# Patient Record
Sex: FEMALE | Employment: OTHER | URBAN - METROPOLITAN AREA
[De-identification: names, ages, dates, MRNs, and addresses within clinical notes are randomized per-mention and may not be internally consistent; named-entity substitution may affect disease eponyms.]

---

## 2023-01-30 ENCOUNTER — OFFICE VISIT (OUTPATIENT)
Dept: URGENT CARE | Facility: CLINIC | Age: 68
End: 2023-01-30
Payer: MEDICARE

## 2023-01-30 VITALS
DIASTOLIC BLOOD PRESSURE: 87 MMHG | SYSTOLIC BLOOD PRESSURE: 151 MMHG | BODY MASS INDEX: 21.6 KG/M2 | HEIGHT: 60 IN | HEART RATE: 84 BPM | TEMPERATURE: 99 F | RESPIRATION RATE: 18 BRPM | OXYGEN SATURATION: 95 % | WEIGHT: 110 LBS

## 2023-01-30 DIAGNOSIS — S69.91XA FINGER INJURY, RIGHT, INITIAL ENCOUNTER: ICD-10-CM

## 2023-01-30 DIAGNOSIS — S60.10XA SUBUNGUAL CONTUSION OF FINGERNAIL, INITIAL ENCOUNTER: Primary | ICD-10-CM

## 2023-01-30 PROCEDURE — 1159F MED LIST DOCD IN RCRD: CPT | Mod: CPTII,S$GLB,, | Performed by: PHYSICIAN ASSISTANT

## 2023-01-30 PROCEDURE — 99213 PR OFFICE/OUTPT VISIT, EST, LEVL III, 20-29 MIN: ICD-10-PCS | Mod: S$GLB,,, | Performed by: PHYSICIAN ASSISTANT

## 2023-01-30 PROCEDURE — 3079F PR MOST RECENT DIASTOLIC BLOOD PRESSURE 80-89 MM HG: ICD-10-PCS | Mod: CPTII,S$GLB,, | Performed by: PHYSICIAN ASSISTANT

## 2023-01-30 PROCEDURE — 3008F PR BODY MASS INDEX (BMI) DOCUMENTED: ICD-10-PCS | Mod: CPTII,S$GLB,, | Performed by: PHYSICIAN ASSISTANT

## 2023-01-30 PROCEDURE — 1125F PR PAIN SEVERITY QUANTIFIED, PAIN PRESENT: ICD-10-PCS | Mod: CPTII,S$GLB,, | Performed by: PHYSICIAN ASSISTANT

## 2023-01-30 PROCEDURE — 1160F PR REVIEW ALL MEDS BY PRESCRIBER/CLIN PHARMACIST DOCUMENTED: ICD-10-PCS | Mod: CPTII,S$GLB,, | Performed by: PHYSICIAN ASSISTANT

## 2023-01-30 PROCEDURE — 73140 X-RAY EXAM OF FINGER(S): CPT | Mod: RT,S$GLB,, | Performed by: RADIOLOGY

## 2023-01-30 PROCEDURE — 99213 OFFICE O/P EST LOW 20 MIN: CPT | Mod: S$GLB,,, | Performed by: PHYSICIAN ASSISTANT

## 2023-01-30 PROCEDURE — 1160F RVW MEDS BY RX/DR IN RCRD: CPT | Mod: CPTII,S$GLB,, | Performed by: PHYSICIAN ASSISTANT

## 2023-01-30 PROCEDURE — 3077F PR MOST RECENT SYSTOLIC BLOOD PRESSURE >= 140 MM HG: ICD-10-PCS | Mod: CPTII,S$GLB,, | Performed by: PHYSICIAN ASSISTANT

## 2023-01-30 PROCEDURE — 3079F DIAST BP 80-89 MM HG: CPT | Mod: CPTII,S$GLB,, | Performed by: PHYSICIAN ASSISTANT

## 2023-01-30 PROCEDURE — 1159F PR MEDICATION LIST DOCUMENTED IN MEDICAL RECORD: ICD-10-PCS | Mod: CPTII,S$GLB,, | Performed by: PHYSICIAN ASSISTANT

## 2023-01-30 PROCEDURE — 3077F SYST BP >= 140 MM HG: CPT | Mod: CPTII,S$GLB,, | Performed by: PHYSICIAN ASSISTANT

## 2023-01-30 PROCEDURE — 73140 XR FINGER 2 OR MORE VIEWS RIGHT: ICD-10-PCS | Mod: RT,S$GLB,, | Performed by: RADIOLOGY

## 2023-01-30 PROCEDURE — 1125F AMNT PAIN NOTED PAIN PRSNT: CPT | Mod: CPTII,S$GLB,, | Performed by: PHYSICIAN ASSISTANT

## 2023-01-30 PROCEDURE — 3008F BODY MASS INDEX DOCD: CPT | Mod: CPTII,S$GLB,, | Performed by: PHYSICIAN ASSISTANT

## 2023-01-30 RX ORDER — AMOXICILLIN AND CLAVULANATE POTASSIUM 875; 125 MG/1; MG/1
1 TABLET, FILM COATED ORAL EVERY 12 HOURS
Qty: 20 TABLET | Refills: 0 | Status: SHIPPED | OUTPATIENT
Start: 2023-01-30 | End: 2023-02-09

## 2023-01-30 RX ORDER — TRAMADOL HYDROCHLORIDE 50 MG/1
50 TABLET ORAL EVERY 8 HOURS PRN
Qty: 12 TABLET | Refills: 0 | Status: SHIPPED | OUTPATIENT
Start: 2023-01-30 | End: 2023-02-03

## 2023-01-30 NOTE — PROGRESS NOTES
Subjective:       Patient ID: Nicole Jacobson is a 67 y.o. female.    Vitals:  height is 5' (1.524 m) and weight is 49.9 kg (110 lb). Her temperature is 98.7 °F (37.1 °C). Her blood pressure is 151/87 (abnormal) and her pulse is 84. Her respiration is 18 and oxygen saturation is 95%.     Chief Complaint: Finger Pain     Pt states injury to right thumb 3 days ago.  Pt states distal right thumb pain and black fingernail.    Finger Pain  This is a new problem. The current episode started in the past 7 days. The problem occurs constantly. The problem has been unchanged. Associated symptoms include arthralgias and joint swelling. Pertinent negatives include no abdominal pain, anorexia, change in bowel habit, chest pain, chills, congestion, coughing, diaphoresis, fatigue, fever, headaches, myalgias, nausea, neck pain, numbness, rash, sore throat, swollen glands, urinary symptoms, vertigo, visual change, vomiting or weakness. The symptoms are aggravated by bending. She has tried ice and NSAIDs for the symptoms. The treatment provided no relief.     Constitution: Negative for chills, sweating, fatigue and fever.   HENT: Negative.  Negative for congestion and sore throat.    Neck: neck negative. Negative for neck pain.   Cardiovascular: Negative.  Negative for chest pain.   Eyes: Negative.    Respiratory: Negative.  Negative for chest tightness, cough and shortness of breath.    Gastrointestinal: Negative.  Negative for abdominal pain, nausea and vomiting.   Endocrine: negative.   Genitourinary: Negative.    Musculoskeletal:  Positive for pain (R thumb), trauma, joint pain and joint swelling. Negative for abnormal ROM of joint and muscle ache.   Skin: Negative.  Negative for color change, rash, erythema and hives.   Allergic/Immunologic: Negative.  Negative for seasonal allergies, hives and itching.   Neurological: Negative.  Negative for dizziness, history of vertigo, light-headedness, headaches, disorientation, altered mental  status, loss of consciousness, numbness and tingling.   Hematologic/Lymphatic: Negative.    Psychiatric/Behavioral: Negative.  Negative for altered mental status, disorientation, confusion, agitation and nervous/anxious. The patient is not nervous/anxious.      Objective:      Physical Exam   Constitutional: She is oriented to person, place, and time. She appears well-developed.  Non-toxic appearance. She does not appear ill. No distress. normal  HENT:   Head: Normocephalic and atraumatic. Head is without abrasion, without contusion and without laceration.   Ears:   Right Ear: External ear normal.   Left Ear: External ear normal.   Nose: Nose normal.   Mouth/Throat: Oropharynx is clear and moist and mucous membranes are normal.   Eyes: Conjunctivae, EOM and lids are normal. Pupils are equal, round, and reactive to light.   Neck: Trachea normal and phonation normal. Neck supple.   Cardiovascular: Normal rate, regular rhythm and normal heart sounds.   Pulmonary/Chest: Effort normal and breath sounds normal. No stridor. No respiratory distress.   Abdominal: Normal appearance.   Musculoskeletal: Normal range of motion.         General: Normal range of motion.        Hands:    Neurological: She is alert and oriented to person, place, and time.   Skin: Skin is warm, dry, intact, not diaphoretic and no rash. Capillary refill takes less than 2 seconds. No abrasion, No burn, No bruising, No erythema and No ecchymosis   Psychiatric: Her speech is normal and behavior is normal. Judgment and thought content normal.   Nursing note and vitals reviewed.      No results found for this or any previous visit. X-Ray Finger 2 or More Views Right    Result Date: 1/30/2023  EXAMINATION: XR FINGER 2 OR MORE VIEWS RIGHT TECHNIQUE: Three views of the right thumb were obtained, with AP, lateral, and oblique projections submitted. COMPARISON: No relevant comparison examinations are currently available.  Clinical information obtained from the  electronic medical record indicates pain following trauma on 01/27/2023. FINDINGS: Visualized osseous structures appear intact, with no definite evidence of recent fracture or other significant abnormality identified.  No radiopaque soft tissue foreign body.     As above Electronically signed by: Jarrett Guzmán MD Date:    01/30/2023 Time:    12:16     Assessment:       1. Subungual contusion of fingernail, initial encounter    2. Finger injury, right, initial encounter          Plan:         Subungual contusion of fingernail, initial encounter  -     traMADoL (ULTRAM) 50 mg tablet; Take 1 tablet (50 mg total) by mouth every 8 (eight) hours as needed for Pain.  Dispense: 12 tablet; Refill: 0  -     amoxicillin-clavulanate 875-125mg (AUGMENTIN) 875-125 mg per tablet; Take 1 tablet by mouth every 12 (twelve) hours. for 10 days  Dispense: 20 tablet; Refill: 0    Finger injury, right, initial encounter  -     X-Ray Finger 2 or More Views Right; Future; Expected date: 01/30/2023         Discuss pain, use of tramadol for breakthrough pain only.  Thumbnail will most likely fall off, continue to monitor and if symptoms worsen follow up with PCP      You must understand that you've received an Urgent Care treatment only and that you may be released before all your medical problems are known or treated. You, the patient, will arrange for follow up care as instructed.  Follow up with your PCP or specialty clinic as directed in the next 1-2 weeks if not improved or as needed.  You can call to schedule an appointment with the appropriate provider.  If your condition worsens we recommend that you receive another evaluation at the emergency room immediately or contact your primary medical clinics after hours call service to discuss your concerns.  Please return here or go to the Emergency Department for any concerns or worsening of condition.    If you were prescribed a narcotic or controlled medication, do not drive or operate heavy  equipment or machinery while taking these medications.  There are no Patient Instructions on file for this visit.      KEELY Dickinson

## 2023-02-01 ENCOUNTER — OFFICE VISIT (OUTPATIENT)
Dept: URGENT CARE | Facility: CLINIC | Age: 68
End: 2023-02-01
Payer: MEDICARE

## 2023-02-01 VITALS
HEART RATE: 83 BPM | BODY MASS INDEX: 21.6 KG/M2 | TEMPERATURE: 98 F | DIASTOLIC BLOOD PRESSURE: 82 MMHG | RESPIRATION RATE: 16 BRPM | WEIGHT: 110 LBS | HEIGHT: 60 IN | SYSTOLIC BLOOD PRESSURE: 146 MMHG | OXYGEN SATURATION: 97 %

## 2023-02-01 DIAGNOSIS — S60.10XD SUBUNGUAL HEMATOMA OF DIGIT OF HAND, SUBSEQUENT ENCOUNTER: Primary | ICD-10-CM

## 2023-02-01 PROCEDURE — 1159F MED LIST DOCD IN RCRD: CPT | Mod: CPTII,S$GLB,, | Performed by: EMERGENCY MEDICINE

## 2023-02-01 PROCEDURE — 99213 OFFICE O/P EST LOW 20 MIN: CPT | Mod: S$GLB,,, | Performed by: EMERGENCY MEDICINE

## 2023-02-01 PROCEDURE — 3079F DIAST BP 80-89 MM HG: CPT | Mod: CPTII,S$GLB,, | Performed by: EMERGENCY MEDICINE

## 2023-02-01 PROCEDURE — 1125F PR PAIN SEVERITY QUANTIFIED, PAIN PRESENT: ICD-10-PCS | Mod: CPTII,S$GLB,, | Performed by: EMERGENCY MEDICINE

## 2023-02-01 PROCEDURE — 3008F BODY MASS INDEX DOCD: CPT | Mod: CPTII,S$GLB,, | Performed by: EMERGENCY MEDICINE

## 2023-02-01 PROCEDURE — 1125F AMNT PAIN NOTED PAIN PRSNT: CPT | Mod: CPTII,S$GLB,, | Performed by: EMERGENCY MEDICINE

## 2023-02-01 PROCEDURE — 1160F RVW MEDS BY RX/DR IN RCRD: CPT | Mod: CPTII,S$GLB,, | Performed by: EMERGENCY MEDICINE

## 2023-02-01 PROCEDURE — 99213 PR OFFICE/OUTPT VISIT, EST, LEVL III, 20-29 MIN: ICD-10-PCS | Mod: S$GLB,,, | Performed by: EMERGENCY MEDICINE

## 2023-02-01 PROCEDURE — 1159F PR MEDICATION LIST DOCUMENTED IN MEDICAL RECORD: ICD-10-PCS | Mod: CPTII,S$GLB,, | Performed by: EMERGENCY MEDICINE

## 2023-02-01 PROCEDURE — 3079F PR MOST RECENT DIASTOLIC BLOOD PRESSURE 80-89 MM HG: ICD-10-PCS | Mod: CPTII,S$GLB,, | Performed by: EMERGENCY MEDICINE

## 2023-02-01 PROCEDURE — 3077F PR MOST RECENT SYSTOLIC BLOOD PRESSURE >= 140 MM HG: ICD-10-PCS | Mod: CPTII,S$GLB,, | Performed by: EMERGENCY MEDICINE

## 2023-02-01 PROCEDURE — 3077F SYST BP >= 140 MM HG: CPT | Mod: CPTII,S$GLB,, | Performed by: EMERGENCY MEDICINE

## 2023-02-01 PROCEDURE — 1160F PR REVIEW ALL MEDS BY PRESCRIBER/CLIN PHARMACIST DOCUMENTED: ICD-10-PCS | Mod: CPTII,S$GLB,, | Performed by: EMERGENCY MEDICINE

## 2023-02-01 PROCEDURE — 3008F PR BODY MASS INDEX (BMI) DOCUMENTED: ICD-10-PCS | Mod: CPTII,S$GLB,, | Performed by: EMERGENCY MEDICINE

## 2023-02-01 RX ORDER — ACETAMINOPHEN AND CODEINE PHOSPHATE 300; 30 MG/1; MG/1
TABLET ORAL
COMMUNITY

## 2023-02-01 NOTE — PATIENT INSTRUCTIONS
Patient Education   Do not take antibiotics amoxicillin/ clavulanic acid     Do not fill prescription for tramadol         Tylenol 500mg 2 tabs by mouth every 8 hours  Max = 8 tabs per day             Bruising Under the Nail   About this topic   Bruising under the nail is also known as a subungual hematoma. This happens when the small blood vessels under the nail bed break and leak blood. The blood becomes trapped under the nail and forms a bruise. Most often this kind of injury is caused when a heavy object hits the finger or toe.  What are the causes?   Most of the time bleeding under the nail is caused by some kind of injury. You may have:  Slammed your finger in a door  Dropped something heavy on your foot or toe  Hit your finger with a heavy object  Stubbed your toe  Shoes that are worn, too tight, or dont fit well  Walked downhill a lot or stopped suddenly, like in a game of basketball or football  What are the main signs?   You will likely have throbbing pain in the nail area. This is due to the blood pooling in and pressing on the nerves in your fingertip. The nail or part of it is often discolored. It may change from red to purple to brown or black as the blood clots.  How does the doctor diagnose this health problem?   Your doctor will ask you questions about your injury and will do an exam. The doctor may order x-rays of your finger or toe.  How does the doctor treat this health problem?   Most of the time, you can use ice and over-the-counter pain drugs to help with swelling while you wait for the blood to be absorbed by the body. If you are worried about a broken bone or damage to your nail, go see your doctor. If you have a recent injury and the bleeding is causing a lot of pain, the doctor may make a small hole in the nail. This allows the blood to drain and eases the pressure. Based on how bad your injury was, your nail may fall off on its own. If there is serious damage to the nail or a broken  bone, the doctor may need to remove the nail. Most often, a new nail will grow again within 6 to 9 months.  What drugs may be needed?   Your doctor may order drugs to:  Ease pain  Prevent swelling  Prevent or treat infection  Will physical activity be limited?   You may need to rest your finger or toe for a certain amount of time. You should not do physical activity that makes your health problem worse. If you work out or play sports, you may not be able to do those things until your health problem gets better.  What problems could happen?   Loss of nail or abnormal nail growth  Infection  Damage to nail or nail bed  When do I need to call the doctor?   Signs of infection. These include a fever of 100.4°F (38°C) or higher, chills, or a wound that will not heal.  Signs of wound infection. These include swelling, redness, warmth around the nail; too much pain when touched; yellowish, greenish, or bloody discharge; foul smell coming from the nail.  Finger or toe feels cold and is pale.  Bleeding that does not stop or a lot of pressure under the nail.  Last Reviewed Date   2021-08-13  Consumer Information Use and Disclaimer   This information is not specific medical advice and does not replace information you receive from your health care provider. This is only a brief summary of general information. It does NOT include all information about conditions, illnesses, injuries, tests, procedures, treatments, therapies, discharge instructions or life-style choices that may apply to you. You must talk with your health care provider for complete information about your health and treatment options. This information should not be used to decide whether or not to accept your health care providers advice, instructions or recommendations. Only your health care provider has the knowledge and training to provide advice that is right for you.  Copyright   Copyright © 2021 [employer] and its affiliates and/or licensors. All rights  reserved.

## 2023-02-01 NOTE — PROGRESS NOTES
Subjective:       Patient ID: Nicole Jacobson is a 67 y.o. female.    Vitals:  height is 5' (1.524 m) and weight is 49.9 kg (110 lb). Her oral temperature is 98.2 °F (36.8 °C). Her blood pressure is 146/82 (abnormal) and her pulse is 83. Her respiration is 16 and oxygen saturation is 97%.     Chief Complaint: Hand Pain (Right thumb)    67 y.o. female presents with thumb injury 7 days ago, which she was seen for here on 1/30/2023.Patient reports she did not take the antibiotics she was prescribed  due to side effects of medication.Patient reports she did not need pain medication because soaking it in episome salted help with pain.Patient reports she is back today due to blacked area spreading and increased redness    Hand Pain   The incident occurred 5 to 7 days ago. The incident occurred at home. The injury mechanism was a direct blow. Pain location: right thumb. Quality: soreness. The pain is at a severity of 4/10. The pain has been Constant since the incident. Pertinent negatives include no numbness or tingling. Treatments tried: episome salt. The treatment provided mild relief.     Musculoskeletal:  Positive for trauma and joint swelling.   Skin:  Positive for bruising. Negative for erythema.   Neurological:  Negative for numbness.     Objective:      Physical Exam   Constitutional: She is oriented to person, place, and time. She appears well-developed.   HENT:   Head: Normocephalic and atraumatic. Head is without abrasion, without contusion and without laceration.   Ears:   Right Ear: External ear normal.   Left Ear: External ear normal.   Oropharyngeal exam not performed due to risk of viral transmission during global pandemic-- risks outweigh benefits of exam          Comments: Oropharyngeal exam not performed due to risk of viral transmission during global pandemic-- risks outweigh benefits of exam      Eyes: Conjunctivae, EOM and lids are normal. Pupils are equal, round, and reactive to light.   Neck: Trachea  normal and phonation normal. Neck supple.   Cardiovascular: Normal rate, regular rhythm and normal heart sounds.   Pulmonary/Chest: Effort normal and breath sounds normal. No stridor. No respiratory distress.   Musculoskeletal:      Left hand: She exhibits decreased range of motion, tenderness and swelling. She exhibits no bony tenderness, normal two-point discrimination, normal capillary refill, no deformity and no laceration. Normal sensation noted. Normal strength noted.        Hands:       Comments:  Patient has a subungual hematoma there is evidence of prior trephination to the base of the nail there is no erythema there is evidence of ecchymosis and dry blood in the paronychia there is no evidence of swelling or cellulitis to the volar aspect of the thumb or to the proximal phalanx there is no tenderness to the base of the hand or the thenar eminence cap refills less than 2 seconds to the thumb   Neurological: She is alert and oriented to person, place, and time.   Skin: Skin is warm, dry, intact and no rash. Capillary refill takes less than 2 seconds. No abrasion, No burn, No bruising, No erythema and No ecchymosis   Psychiatric: Her speech is normal and behavior is normal. Judgment and thought content normal.   Nursing note and vitals reviewed.      Assessment:       1. Subungual hematoma of digit of hand, subsequent encounter          Plan:         Subungual hematoma of digit of hand, subsequent encounter                  Medical decision making:  Previous results of patient's x-rays were reviewed.  Previous prescriptions were also reviewed.  Patient advised not to take tramadol for pain.  She is advised to take Tylenol over-the-counter for mild pain only.  She will be placed in a splint to her finger for comfort only.  Patient also informed that there is no evidence of active infection and she does not require Augmentin therapy.  She is given general warning precautions to return for any signs of  infection.      Patient Instructions   Patient Education   Do not take antibiotics amoxicillin/ clavulanic acid     Do not fill prescription for tramadol         Tylenol 500mg 2 tabs by mouth every 8 hours  Max = 8 tabs per day             Bruising Under the Nail   About this topic   Bruising under the nail is also known as a subungual hematoma. This happens when the small blood vessels under the nail bed break and leak blood. The blood becomes trapped under the nail and forms a bruise. Most often this kind of injury is caused when a heavy object hits the finger or toe.  What are the causes?   Most of the time bleeding under the nail is caused by some kind of injury. You may have:  Slammed your finger in a door  Dropped something heavy on your foot or toe  Hit your finger with a heavy object  Stubbed your toe  Shoes that are worn, too tight, or dont fit well  Walked downhill a lot or stopped suddenly, like in a game of basketball or football  What are the main signs?   You will likely have throbbing pain in the nail area. This is due to the blood pooling in and pressing on the nerves in your fingertip. The nail or part of it is often discolored. It may change from red to purple to brown or black as the blood clots.  How does the doctor diagnose this health problem?   Your doctor will ask you questions about your injury and will do an exam. The doctor may order x-rays of your finger or toe.  How does the doctor treat this health problem?   Most of the time, you can use ice and over-the-counter pain drugs to help with swelling while you wait for the blood to be absorbed by the body. If you are worried about a broken bone or damage to your nail, go see your doctor. If you have a recent injury and the bleeding is causing a lot of pain, the doctor may make a small hole in the nail. This allows the blood to drain and eases the pressure. Based on how bad your injury was, your nail may fall off on its own. If there is  serious damage to the nail or a broken bone, the doctor may need to remove the nail. Most often, a new nail will grow again within 6 to 9 months.  What drugs may be needed?   Your doctor may order drugs to:  Ease pain  Prevent swelling  Prevent or treat infection  Will physical activity be limited?   You may need to rest your finger or toe for a certain amount of time. You should not do physical activity that makes your health problem worse. If you work out or play sports, you may not be able to do those things until your health problem gets better.  What problems could happen?   Loss of nail or abnormal nail growth  Infection  Damage to nail or nail bed  When do I need to call the doctor?   Signs of infection. These include a fever of 100.4°F (38°C) or higher, chills, or a wound that will not heal.  Signs of wound infection. These include swelling, redness, warmth around the nail; too much pain when touched; yellowish, greenish, or bloody discharge; foul smell coming from the nail.  Finger or toe feels cold and is pale.  Bleeding that does not stop or a lot of pressure under the nail.  Last Reviewed Date   2021-08-13  Consumer Information Use and Disclaimer   This information is not specific medical advice and does not replace information you receive from your health care provider. This is only a brief summary of general information. It does NOT include all information about conditions, illnesses, injuries, tests, procedures, treatments, therapies, discharge instructions or life-style choices that may apply to you. You must talk with your health care provider for complete information about your health and treatment options. This information should not be used to decide whether or not to accept your health care providers advice, instructions or recommendations. Only your health care provider has the knowledge and training to provide advice that is right for you.  Copyright   Copyright © 2021 [employer] and its  affiliates and/or licensors. All rights reserved.